# Patient Record
Sex: MALE | Race: ASIAN | NOT HISPANIC OR LATINO | URBAN - METROPOLITAN AREA
[De-identification: names, ages, dates, MRNs, and addresses within clinical notes are randomized per-mention and may not be internally consistent; named-entity substitution may affect disease eponyms.]

---

## 2023-03-16 ENCOUNTER — OFFICE VISIT (OUTPATIENT)
Dept: URGENT CARE | Facility: URGENT CARE | Age: 48
End: 2023-03-16
Payer: COMMERCIAL

## 2023-03-16 VITALS
RESPIRATION RATE: 16 BRPM | OXYGEN SATURATION: 97 % | HEIGHT: 61 IN | TEMPERATURE: 97.5 F | WEIGHT: 115 LBS | BODY MASS INDEX: 21.71 KG/M2 | HEART RATE: 75 BPM

## 2023-03-16 DIAGNOSIS — R07.0 THROAT PAIN: Primary | ICD-10-CM

## 2023-03-16 LAB — DEPRECATED S PYO AG THROAT QL EIA: NEGATIVE

## 2023-03-16 PROCEDURE — 87651 STREP A DNA AMP PROBE: CPT | Performed by: FAMILY MEDICINE

## 2023-03-16 PROCEDURE — 99203 OFFICE O/P NEW LOW 30 MIN: CPT | Performed by: FAMILY MEDICINE

## 2023-03-16 NOTE — PROGRESS NOTES
"Assessment & Plan     Throat pain  - Streptococcus A Rapid Screen w/Reflex to PCR - Clinic Collect  - Group A Streptococcus PCR Throat Swab     Patient encouraged to do home COVID test today.  Exam of his throat was unremarkable.  Vital signs stable.  Strep PCR is pending at this present time. Presumed viral illness, symptomatic recs given.   See AVS summary for additional recommendations reviewed with patient during this visit.       Rohan Celestin MD   [facility]    Subjective     Vail is a 48 year old male who presents to clinic today for the following health issues:  Chief Complaint   Patient presents with     Urgent Care     Pharyngitis     Sore throat and fever, daughter has strep     HPI    Patient comes today for evaluation of sore throat, low-grade fever and nasal congestion that started in the last couple days.  His daughter was diagnosed with strep late last week.  His wife is also here today for evaluation.  No difficulty with breathing or shortness of breath.  No history of lung disease or heart disease.  Has not tried any remedies yet.  Has not yet done a home COVID test.  He has not had COVID in the last 6 months.        Patient Active Problem List    Diagnosis Date Noted     CARDIOVASCULAR SCREENING; LDL GOAL LESS THAN 160 08/13/2013     Priority: Medium       No current outpatient medications on file.     No current facility-administered medications for this visit.           Objective    Pulse 75   Temp 97.5  F (36.4  C) (Temporal)   Resp 16   Ht 1.549 m (5' 1\")   Wt 52.2 kg (115 lb)   SpO2 97%   BMI 21.73 kg/m    Physical Exam   CV: HDS  Throat: no enlarged tonsils, mallampati III/IV, no trismus  GEN: NAD, appears age    Results for orders placed or performed in visit on 03/16/23   Streptococcus A Rapid Screen w/Reflex to PCR - Clinic Collect     Status: Normal    Specimen: Throat; Swab   Result Value Ref Range    Group A Strep antigen Negative Negative                     The " use of [employer] services may have been used to construct the content in this note; any grammatical or spelling errors are non-intentional. Please contact the author of this note directly if you are in need of any clarification.

## 2023-03-16 NOTE — PATIENT INSTRUCTIONS
Strep test PCR will be completed in the next 24 hours we will call you if the test is positive      If your symptoms worsen please call your Doctor's office or come back to urgent care      Ibuprofen 400mg and/or tylenol 500mg every 4 hours as needed for discomfort      Please do a home rapid COVID test if you have not already done one

## 2023-03-17 DIAGNOSIS — J02.0 STREP THROAT: Primary | ICD-10-CM

## 2023-03-17 LAB — GROUP A STREP BY PCR: DETECTED

## 2023-03-17 RX ORDER — PENICILLIN V POTASSIUM 500 MG/1
500 TABLET, FILM COATED ORAL 2 TIMES DAILY
Qty: 20 TABLET | Refills: 0 | Status: SHIPPED | OUTPATIENT
Start: 2023-03-17 | End: 2023-03-27

## 2023-03-17 NOTE — RESULT ENCOUNTER NOTE
Please call patient and advise he is positive for strep infection, requiring antibioitics.  Penicillin has been sent to the [facility] in [place] on [address]

## 2023-03-18 ENCOUNTER — TELEPHONE (OUTPATIENT)
Dept: URGENT CARE | Facility: URGENT CARE | Age: 48
End: 2023-03-18
Payer: COMMERCIAL

## 2023-03-18 NOTE — TELEPHONE ENCOUNTER
lmtcb re:  Ben Leo PA-C  P [facility]  Nurse  Please call patient and advise he is positive for strep infection, requiring antibioitics.  Penicillin has been sent to the [facility] in [place] on [address]   Symone Pina, CMA

## 2025-09-04 ENCOUNTER — OFFICE VISIT (OUTPATIENT)
Dept: URGENT CARE | Facility: URGENT CARE | Age: 50
End: 2025-09-04
Payer: COMMERCIAL

## 2025-09-04 ENCOUNTER — ANCILLARY PROCEDURE (OUTPATIENT)
Dept: GENERAL RADIOLOGY | Facility: CLINIC | Age: 50
End: 2025-09-04
Attending: PHYSICIAN ASSISTANT
Payer: COMMERCIAL

## 2025-09-04 VITALS
RESPIRATION RATE: 18 BRPM | TEMPERATURE: 97.9 F | WEIGHT: 115 LBS | DIASTOLIC BLOOD PRESSURE: 60 MMHG | OXYGEN SATURATION: 97 % | HEART RATE: 66 BPM | BODY MASS INDEX: 21.73 KG/M2 | SYSTOLIC BLOOD PRESSURE: 110 MMHG

## 2025-09-04 DIAGNOSIS — R10.31 RLQ ABDOMINAL PAIN: ICD-10-CM

## 2025-09-04 DIAGNOSIS — R10.31 RLQ ABDOMINAL PAIN: Primary | ICD-10-CM

## 2025-09-04 LAB
ALBUMIN SERPL-MCNC: 3.8 G/DL (ref 3.4–5)
ALP SERPL-CCNC: 91 U/L (ref 40–150)
ALT SERPL W P-5'-P-CCNC: 20 U/L (ref 0–70)
ANION GAP SERPL CALCULATED.3IONS-SCNC: 10 MMOL/L (ref 3–14)
AST SERPL W P-5'-P-CCNC: 26 U/L (ref 0–45)
BASOPHILS # BLD AUTO: 0.09 10E3/UL (ref 0–0.2)
BASOPHILS NFR BLD AUTO: 1.2 %
BILIRUB SERPL-MCNC: 0.7 MG/DL (ref 0.2–1.3)
BUN SERPL-MCNC: 11 MG/DL (ref 7–30)
CALCIUM SERPL-MCNC: 10.1 MG/DL (ref 8.5–10.1)
CHLORIDE BLD-SCNC: 106 MMOL/L (ref 94–109)
CO2 SERPL-SCNC: 25 MMOL/L (ref 20–32)
CREAT SERPL-MCNC: 0.8 MG/DL (ref 0.66–1.25)
EGFRCR SERPLBLD CKD-EPI 2021: >90 ML/MIN/1.73M2
EOSINOPHIL # BLD AUTO: 0.33 10E3/UL (ref 0–0.7)
EOSINOPHIL NFR BLD AUTO: 4.6 %
ERYTHROCYTE [DISTWIDTH] IN BLOOD BY AUTOMATED COUNT: 12.4 % (ref 10–15)
GLUCOSE BLD-MCNC: 101 MG/DL (ref 70–99)
HCT VFR BLD AUTO: 46.9 % (ref 40–53)
HGB BLD-MCNC: 15.1 G/DL (ref 13.3–17.7)
IMM GRANULOCYTES # BLD: <0.04 10E3/UL
IMM GRANULOCYTES NFR BLD: 0.3 %
LYMPHOCYTES # BLD AUTO: 2.65 10E3/UL (ref 0.8–5.3)
LYMPHOCYTES NFR BLD AUTO: 36.7 %
MCH RBC QN AUTO: 27.5 PG (ref 26.5–33)
MCHC RBC AUTO-ENTMCNC: 32.2 G/DL (ref 31.5–36.5)
MCV RBC AUTO: 85.4 FL (ref 78–100)
MONOCYTES # BLD AUTO: 0.5 10E3/UL (ref 0–1.3)
MONOCYTES NFR BLD AUTO: 6.9 %
NEUTROPHILS # BLD AUTO: 3.64 10E3/UL (ref 1.6–8.3)
NEUTROPHILS NFR BLD AUTO: 50.3 %
PLATELET # BLD AUTO: 359 10E3/UL (ref 150–450)
POTASSIUM BLD-SCNC: 4.4 MMOL/L (ref 3.4–5.3)
PROT SERPL-MCNC: 8.8 G/DL (ref 6.8–8.8)
RBC # BLD AUTO: 5.49 10E6/UL (ref 4.4–5.9)
SODIUM SERPL-SCNC: 141 MMOL/L (ref 135–145)
WBC # BLD AUTO: 7.23 10E3/UL (ref 4–11)